# Patient Record
Sex: MALE | Race: WHITE | NOT HISPANIC OR LATINO | URBAN - METROPOLITAN AREA
[De-identification: names, ages, dates, MRNs, and addresses within clinical notes are randomized per-mention and may not be internally consistent; named-entity substitution may affect disease eponyms.]

---

## 2024-04-30 ENCOUNTER — OFFICE (OUTPATIENT)
Dept: URBAN - METROPOLITAN AREA CLINIC 102 | Facility: CLINIC | Age: 84
End: 2024-04-30

## 2024-04-30 VITALS
HEART RATE: 82 BPM | WEIGHT: 220 LBS | SYSTOLIC BLOOD PRESSURE: 171 MMHG | DIASTOLIC BLOOD PRESSURE: 86 MMHG | HEIGHT: 72 IN | TEMPERATURE: 97.5 F

## 2024-04-30 DIAGNOSIS — Z79.01 LONG TERM (CURRENT) USE OF ANTICOAGULANTS: ICD-10-CM

## 2024-04-30 DIAGNOSIS — Z12.11 ENCOUNTER FOR SCREENING FOR MALIGNANT NEOPLASM OF COLON: ICD-10-CM

## 2024-04-30 DIAGNOSIS — E11.9 TYPE 2 DIABETES MELLITUS WITHOUT COMPLICATIONS: ICD-10-CM

## 2024-04-30 NOTE — SERVICEHPINOTES
REJI MOFFETT   is a   83   male who here for a screening colonoscopy
br brWent to ER January for dysuria and blood sugar was elevated and "has crystals on kidneys and also advised to have a colonoscopy."
br
brHas BM daily, BSS 3-6br
brDenies blood in stool, weight loss, abdominal pain, n/v/dbr
br Last colonoscopy 10 years with a polyp removed. br br
br
Takes clopidogrel daily s/p cardiac pacemaker 2023.  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